# Patient Record
Sex: FEMALE | Race: WHITE
[De-identification: names, ages, dates, MRNs, and addresses within clinical notes are randomized per-mention and may not be internally consistent; named-entity substitution may affect disease eponyms.]

---

## 2018-08-20 PROBLEM — Z00.00 ENCOUNTER FOR PREVENTIVE HEALTH EXAMINATION: Status: ACTIVE | Noted: 2018-08-20

## 2018-08-21 ENCOUNTER — APPOINTMENT (OUTPATIENT)
Dept: CARDIOLOGY | Facility: CLINIC | Age: 73
End: 2018-08-21

## 2018-09-20 ENCOUNTER — APPOINTMENT (OUTPATIENT)
Dept: ENDOVASCULAR SURGERY | Facility: CLINIC | Age: 73
End: 2018-09-20
Payer: COMMERCIAL

## 2018-09-20 PROCEDURE — 77001 FLUOROGUIDE FOR VEIN DEVICE: CPT

## 2018-09-20 PROCEDURE — 36561 INSERT TUNNELED CV CATH: CPT

## 2018-09-20 PROCEDURE — 76937 US GUIDE VASCULAR ACCESS: CPT

## 2018-12-04 ENCOUNTER — OUTPATIENT (OUTPATIENT)
Dept: OUTPATIENT SERVICES | Facility: HOSPITAL | Age: 73
LOS: 1 days | Discharge: HOME | End: 2018-12-04

## 2018-12-05 ENCOUNTER — OUTPATIENT (OUTPATIENT)
Dept: OUTPATIENT SERVICES | Facility: HOSPITAL | Age: 73
LOS: 1 days | End: 2018-12-05
Payer: COMMERCIAL

## 2018-12-05 ENCOUNTER — APPOINTMENT (OUTPATIENT)
Age: 73
End: 2018-12-05

## 2018-12-05 DIAGNOSIS — C34.90 MALIGNANT NEOPLASM OF UNSPECIFIED PART OF UNSPECIFIED BRONCHUS OR LUNG: ICD-10-CM

## 2018-12-05 DIAGNOSIS — D64.9 ANEMIA, UNSPECIFIED: ICD-10-CM

## 2018-12-05 LAB
BLD GP AB SCN SERPL QL: NEGATIVE — SIGNIFICANT CHANGE UP
RH IG SCN BLD-IMP: NEGATIVE — SIGNIFICANT CHANGE UP
RH IG SCN BLD-IMP: NEGATIVE — SIGNIFICANT CHANGE UP

## 2018-12-08 ENCOUNTER — APPOINTMENT (OUTPATIENT)
Age: 73
End: 2018-12-08

## 2018-12-11 DIAGNOSIS — R11.2 NAUSEA WITH VOMITING, UNSPECIFIED: ICD-10-CM

## 2018-12-11 DIAGNOSIS — Z51.89 ENCOUNTER FOR OTHER SPECIFIED AFTERCARE: ICD-10-CM

## 2018-12-12 ENCOUNTER — EMERGENCY (EMERGENCY)
Facility: HOSPITAL | Age: 73
LOS: 1 days | Discharge: HOME | End: 2018-12-12
Attending: EMERGENCY MEDICINE
Payer: COMMERCIAL

## 2018-12-12 VITALS
RESPIRATION RATE: 18 BRPM | DIASTOLIC BLOOD PRESSURE: 83 MMHG | WEIGHT: 166.89 LBS | HEIGHT: 65 IN | OXYGEN SATURATION: 98 % | SYSTOLIC BLOOD PRESSURE: 129 MMHG | HEART RATE: 105 BPM | TEMPERATURE: 98 F

## 2018-12-12 VITALS
DIASTOLIC BLOOD PRESSURE: 74 MMHG | TEMPERATURE: 98 F | HEART RATE: 75 BPM | RESPIRATION RATE: 18 BRPM | OXYGEN SATURATION: 99 % | SYSTOLIC BLOOD PRESSURE: 132 MMHG

## 2018-12-12 LAB
ALBUMIN SERPL ELPH-MCNC: 3.3 G/DL — SIGNIFICANT CHANGE UP (ref 3.3–5)
ALBUMIN SERPL ELPH-MCNC: 3.4 G/DL — SIGNIFICANT CHANGE UP (ref 3.3–5)
ALP SERPL-CCNC: 101 U/L — SIGNIFICANT CHANGE UP (ref 40–120)
ALP SERPL-CCNC: 97 U/L — SIGNIFICANT CHANGE UP (ref 40–120)
ALT FLD-CCNC: 55 U/L — HIGH (ref 10–45)
ALT FLD-CCNC: 60 U/L — HIGH (ref 10–45)
ANION GAP SERPL CALC-SCNC: 15 MMOL/L — SIGNIFICANT CHANGE UP (ref 5–17)
ANION GAP SERPL CALC-SCNC: 20 MMOL/L — HIGH (ref 5–17)
APTT BLD: 32 SEC — SIGNIFICANT CHANGE UP (ref 27.5–36.3)
AST SERPL-CCNC: 112 U/L — HIGH (ref 10–40)
AST SERPL-CCNC: 68 U/L — HIGH (ref 10–40)
BASOPHILS # BLD AUTO: 0 K/UL — SIGNIFICANT CHANGE UP (ref 0–0.2)
BILIRUB SERPL-MCNC: 0.3 MG/DL — SIGNIFICANT CHANGE UP (ref 0.2–1.2)
BILIRUB SERPL-MCNC: 0.3 MG/DL — SIGNIFICANT CHANGE UP (ref 0.2–1.2)
BUN SERPL-MCNC: 24 MG/DL — HIGH (ref 7–23)
BUN SERPL-MCNC: 26 MG/DL — HIGH (ref 7–23)
CALCIUM SERPL-MCNC: 8.4 MG/DL — SIGNIFICANT CHANGE UP (ref 8.4–10.5)
CALCIUM SERPL-MCNC: 8.4 MG/DL — SIGNIFICANT CHANGE UP (ref 8.4–10.5)
CHLORIDE SERPL-SCNC: 97 MMOL/L — SIGNIFICANT CHANGE UP (ref 96–108)
CHLORIDE SERPL-SCNC: 97 MMOL/L — SIGNIFICANT CHANGE UP (ref 96–108)
CO2 SERPL-SCNC: 20 MMOL/L — LOW (ref 22–31)
CO2 SERPL-SCNC: 21 MMOL/L — LOW (ref 22–31)
CREAT SERPL-MCNC: 1.13 MG/DL — SIGNIFICANT CHANGE UP (ref 0.5–1.3)
CREAT SERPL-MCNC: 1.15 MG/DL — SIGNIFICANT CHANGE UP (ref 0.5–1.3)
EOSINOPHIL # BLD AUTO: 0.1 K/UL — SIGNIFICANT CHANGE UP (ref 0–0.5)
GAS PNL BLDV: SIGNIFICANT CHANGE UP
GLUCOSE SERPL-MCNC: 132 MG/DL — HIGH (ref 70–99)
GLUCOSE SERPL-MCNC: 133 MG/DL — HIGH (ref 70–99)
HCT VFR BLD CALC: 36 % — SIGNIFICANT CHANGE UP (ref 34.5–45)
HGB BLD-MCNC: 11.7 G/DL — SIGNIFICANT CHANGE UP (ref 11.5–15.5)
INR BLD: 1.51 RATIO — HIGH (ref 0.88–1.16)
LYMPHOCYTES # BLD AUTO: 0.5 K/UL — LOW (ref 1–3.3)
LYMPHOCYTES # BLD AUTO: 8 % — LOW (ref 13–44)
MCHC RBC-ENTMCNC: 29.4 PG — SIGNIFICANT CHANGE UP (ref 27–34)
MCHC RBC-ENTMCNC: 32.5 GM/DL — SIGNIFICANT CHANGE UP (ref 32–36)
MCV RBC AUTO: 90.4 FL — SIGNIFICANT CHANGE UP (ref 80–100)
MONOCYTES # BLD AUTO: 0.7 K/UL — SIGNIFICANT CHANGE UP (ref 0–0.9)
MONOCYTES NFR BLD AUTO: 6 % — SIGNIFICANT CHANGE UP (ref 2–14)
NEUTROPHILS # BLD AUTO: 7.4 K/UL — SIGNIFICANT CHANGE UP (ref 1.8–7.4)
NEUTROPHILS NFR BLD AUTO: 82 % — HIGH (ref 43–77)
PLATELET # BLD AUTO: 247 K/UL — SIGNIFICANT CHANGE UP (ref 150–400)
POTASSIUM SERPL-MCNC: 4.6 MMOL/L — SIGNIFICANT CHANGE UP (ref 3.5–5.3)
POTASSIUM SERPL-MCNC: 6.5 MMOL/L — CRITICAL HIGH (ref 3.5–5.3)
POTASSIUM SERPL-MCNC: 9 MMOL/L — CRITICAL HIGH (ref 3.5–5.3)
POTASSIUM SERPL-SCNC: 4.6 MMOL/L — SIGNIFICANT CHANGE UP (ref 3.5–5.3)
POTASSIUM SERPL-SCNC: 6.5 MMOL/L — CRITICAL HIGH (ref 3.5–5.3)
POTASSIUM SERPL-SCNC: 9 MMOL/L — CRITICAL HIGH (ref 3.5–5.3)
PROT SERPL-MCNC: 7.3 G/DL — SIGNIFICANT CHANGE UP (ref 6–8.3)
PROT SERPL-MCNC: 7.6 G/DL — SIGNIFICANT CHANGE UP (ref 6–8.3)
PROTHROM AB SERPL-ACNC: 17.6 SEC — HIGH (ref 10–12.9)
RBC # BLD: 3.99 M/UL — SIGNIFICANT CHANGE UP (ref 3.8–5.2)
RBC # FLD: 20 % — HIGH (ref 10.3–14.5)
SODIUM SERPL-SCNC: 133 MMOL/L — LOW (ref 135–145)
SODIUM SERPL-SCNC: 137 MMOL/L — SIGNIFICANT CHANGE UP (ref 135–145)
TROPONIN T, HIGH SENSITIVITY RESULT: 12 NG/L — SIGNIFICANT CHANGE UP (ref 0–51)
WBC # BLD: 8.7 K/UL — SIGNIFICANT CHANGE UP (ref 3.8–10.5)
WBC # FLD AUTO: 8.7 K/UL — SIGNIFICANT CHANGE UP (ref 3.8–10.5)

## 2018-12-12 PROCEDURE — 83605 ASSAY OF LACTIC ACID: CPT

## 2018-12-12 PROCEDURE — 71046 X-RAY EXAM CHEST 2 VIEWS: CPT | Mod: 26

## 2018-12-12 PROCEDURE — 72125 CT NECK SPINE W/O DYE: CPT

## 2018-12-12 PROCEDURE — 93010 ELECTROCARDIOGRAM REPORT: CPT | Mod: NC

## 2018-12-12 PROCEDURE — 84295 ASSAY OF SERUM SODIUM: CPT

## 2018-12-12 PROCEDURE — 85610 PROTHROMBIN TIME: CPT

## 2018-12-12 PROCEDURE — 70450 CT HEAD/BRAIN W/O DYE: CPT | Mod: 26

## 2018-12-12 PROCEDURE — 72125 CT NECK SPINE W/O DYE: CPT | Mod: 26

## 2018-12-12 PROCEDURE — 99284 EMERGENCY DEPT VISIT MOD MDM: CPT | Mod: 25

## 2018-12-12 PROCEDURE — 70450 CT HEAD/BRAIN W/O DYE: CPT

## 2018-12-12 PROCEDURE — 93005 ELECTROCARDIOGRAM TRACING: CPT

## 2018-12-12 PROCEDURE — 71046 X-RAY EXAM CHEST 2 VIEWS: CPT

## 2018-12-12 PROCEDURE — 82435 ASSAY OF BLOOD CHLORIDE: CPT

## 2018-12-12 PROCEDURE — 99285 EMERGENCY DEPT VISIT HI MDM: CPT | Mod: 25

## 2018-12-12 PROCEDURE — 85730 THROMBOPLASTIN TIME PARTIAL: CPT

## 2018-12-12 PROCEDURE — 84132 ASSAY OF SERUM POTASSIUM: CPT

## 2018-12-12 PROCEDURE — 84484 ASSAY OF TROPONIN QUANT: CPT

## 2018-12-12 PROCEDURE — 85027 COMPLETE CBC AUTOMATED: CPT

## 2018-12-12 PROCEDURE — 82803 BLOOD GASES ANY COMBINATION: CPT

## 2018-12-12 PROCEDURE — 82330 ASSAY OF CALCIUM: CPT

## 2018-12-12 PROCEDURE — 82947 ASSAY GLUCOSE BLOOD QUANT: CPT

## 2018-12-12 PROCEDURE — 80053 COMPREHEN METABOLIC PANEL: CPT

## 2018-12-12 PROCEDURE — 70480 CT ORBIT/EAR/FOSSA W/O DYE: CPT

## 2018-12-12 PROCEDURE — 85014 HEMATOCRIT: CPT

## 2018-12-12 RX ORDER — ACETAMINOPHEN 500 MG
650 TABLET ORAL ONCE
Qty: 0 | Refills: 0 | Status: COMPLETED | OUTPATIENT
Start: 2018-12-12 | End: 2018-12-12

## 2018-12-12 RX ADMIN — Medication 650 MILLIGRAM(S): at 19:00

## 2018-12-12 RX ADMIN — Medication 650 MILLIGRAM(S): at 16:57

## 2018-12-12 NOTE — ED PROVIDER NOTE - ATTENDING CONTRIBUTION TO CARE
I performed a history and physical exam of the patient and discussed their management with the resident and /or advanced care provider. I reviewed the resident and /or ACP's note and agree with the documented findings and plan of care. My medical decision making and observations are found above.  Lungs clear, contusion around lt eye, scleral hematomas.

## 2018-12-12 NOTE — ED ADULT NURSE NOTE - NSIMPLEMENTINTERV_GEN_ALL_ED
Implemented All Universal Safety Interventions:  [place] to call system. Call bell, personal items and telephone within reach. Instruct patient to call for assistance. Room bathroom lighting operational. Non-slip footwear when patient is off stretcher. Physically safe environment: no spills, clutter or unnecessary equipment. Stretcher in lowest position, wheels locked, appropriate side rails in place.

## 2018-12-12 NOTE — ED PROVIDER NOTE - MEDICAL DECISION MAKING DETAILS
Linsey: 72F w/PMH lung CA, COPD, afib on eliquis, GERD, anemia p/w head trauma s/p presumed roll out of bed. Persistent cp/sob. R/o PNA/effusion, on eliquis and recent CT so will not PE for now, r/o ACS/arrhythmia, r/o ICH/orbital fx. CTH, CT cspine, CT orbit, EKG, CXR, labs, analgesia, IVF. Linsey: 72F w/PMH lung CA, COPD, afib on eliquis, GERD, anemia p/w head trauma s/p presumed roll out of bed. Persistent cp/sob. R/o PNA/effusion, on eliquis and recent CT so will not PE for now, r/o ACS/arrhythmia, r/o ICH/orbital fx. CTH, CT cspine, CT orbit, EKG, CXR, labs, analgesia, IVF.  Ronnie: Patient with fall, at risk for bleed.  Patient with corneal abraision, contusion around eye, will ct and check labs. SOB is chronic >3months. no recent change. Would not mike PE at this time.

## 2018-12-12 NOTE — ED PROVIDER NOTE - OBJECTIVE STATEMENT
72F w/PMH lung CA on chemo (last 2w ago) c/b anemia s/p transfusion 2U 4d ago, GERD, htn, 72F w/PMH lung CA on chemo (last 2w ago) c/b anemia s/p transfusion 2U 4d ago, GERD, htn, on decadron for chemo p/w fall out of bed while sleeping last night. Patient was in [facility] but has had 72F w/PMH lung CA on chemo (last 2w ago) c/b anemia s/p transfusion 2U 4d ago, COPD. afib on Eliquis, GERD, htn, on decadron for chemo p/w fall out of bed while sleeping last night. Patient was in [facility] but has had persistent sob/cp since chemo, not improved with transfusion. Vomited x1 after transfusion. NO prior transfusions. Had CT chest 6d ago. No f/c, cough, abd pain, d/c, melena/BRBPR, urinary sx, syncope, headache, weakness/numbness, visual changes, speech changes. Went to ophthalmologist today to get clearance for chemo who dx a L "corneal tear" and rx-ed ppx abx. No n/v since. No jaw pain.

## 2018-12-12 NOTE — ED PROVIDER NOTE - NSFOLLOWUPINSTRUCTIONS_ED_ALL_ED_FT
Follow up with your PMD in the next 24 to 48 hours.  Return to the ER if you have any loss of consciousness, chest pain, shortness of breath or any other concerning symptoms.  Continue to take your home medications as previously prescribed.

## 2018-12-12 NOTE — ED ADULT NURSE NOTE - OBJECTIVE STATEMENT
72YOF PMH afib, GERD, COPD. Presents to ED s/p fall last night when she was getting out of bed, pt states she hit her head and her eye on the dressing table drawer. Pt has a perorbital hematoma on left eye with no active bleeding or drainage. Pt denies CP, SOB, HA, dizziness, blurry vision. 72YOF PMH afib, GERD, COPD. Presents to ED s/p fall last night when she was getting out of bed, pt states she hit her head and her eye on the dressing table drawer. Pt has a perorbital hematoma on left eye with no active bleeding or drainage. Pt able to move all extremities. Equal and bilateral strength and sensation  in all extremities. Neuro intact. Pt breathing unlabored. Abdomen soft, nontender, nondistended. Pt denies CP, SOB, HA, dizziness, blurry vision, back pain, abdominal pain, burning upon urination. Comfort and safety measures provided. Call bell within reach. Will continue to monitor. 72YOF PMH afib, GERD, COPD. Presents to ED s/p fall last night when she was getting out of bed, pt states she hit her head and her eye on the dressing table drawer. Pt has a periorbital hematoma on left eye no active bleeding or drainage noted. Pt able to move all extremities. Equal and bilateral strength and sensation  in all extremities. Neuro intact. Pt breathing unlabored. Abdomen soft, nontender, nondistended. Pt denies CP, SOB, HA, dizziness, blurry vision, back pain, abdominal pain, burning upon urination. Comfort and safety measures provided. Call bell within reach. Will continue to monitor. Family at bedside 72YOF PMH afib, GERD, COPD, lung cancer and received chemo two weeks ago, pt received transfusion for anemia 4 days ago 2 units. Pt was at ophthalmologist this morning to be cleared for chemo and was sent to the ED. Presents to ED s/p fall last night when she was getting out of bed, pt states she hit her head and her eye on the dressing table drawer. Pt has a periorbital hematoma on left eye no active bleeding or drainage noted. Pt able to move all extremities. Equal and bilateral strength and sensation  in all extremities. Neuro intact. Pt breathing unlabored. Abdomen soft, nontender, nondistended. Pt states she has had persistent SOB on walking and chest pain epigastric occasionally, but denies it at present. Pt has a mediport on right upper chest, but not accessed at the ED. Pt denies HA, dizziness, blurry vision, back pain, abdominal pain, burning upon urination. Comfort and safety measures provided. Call bell within reach. Will continue to monitor. Family at bedside. 72YOF PMH afib, GERD, COPD, lung cancer on chemo, last chemo two weeks ago. pt also received transfusion for anemia 4 days ago 2 units PRBCs. Pt was at ophthalmologist this morning to be cleared for chemo and was sent to the ED for eval of L eye. Presents to ED s/p fall last night when she was getting out of bed, pt states she hit her head and her eye on the dressing table drawer. Pt has a periorbital hematoma on left eye no active bleeding or drainage noted. Pt able to move all extremities. Equal and bilateral strength and sensation  in all extremities. Neuro intact. Pt breathing unlabored. Abdomen soft, nontender, nondistended. Pt states she has had persistent SOB on walking and chest pain epigastric occasionally, but denies it at present. Pt has a mediport on right upper chest, but not accessed at the ED. Pt denies HA, dizziness, blurry vision, back pain, abdominal pain, burning upon urination. Comfort and safety measures provided. Call bell within reach. Will continue to monitor. Family at bedside.

## 2018-12-12 NOTE — ED PROVIDER NOTE - ENMT, MLM
Airway patent, Nasal mucosa clear. Mouth with normal mucosa. Throat has no vesicles, no oropharyngeal exudates and uvula is midline. +contusion with ecchymosis to L eye, +ttp

## 2018-12-27 PROCEDURE — 86901 BLOOD TYPING SEROLOGIC RH(D): CPT

## 2018-12-27 PROCEDURE — 86850 RBC ANTIBODY SCREEN: CPT

## 2018-12-27 PROCEDURE — 86900 BLOOD TYPING SEROLOGIC ABO: CPT

## 2018-12-27 PROCEDURE — 86923 COMPATIBILITY TEST ELECTRIC: CPT
